# Patient Record
(demographics unavailable — no encounter records)

---

## 2025-05-20 NOTE — HISTORY OF PRESENT ILLNESS
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  5/20/25: f/u MRI R wrist. f/u RIGHT wrist sprain/contusion. in wrist brace most of the day, removes it for driving and sleeping.  reports wrist pain is a little better. c/o persistent headache. + Ibuprofen, Tylenol PRN pain.  MRI of R wrist 4/25/25 - IMPRESSION: 1. Mild degeneration of the interosseous scapholunate ligament without tear or widening of the scapholunate space. 2. Septated and lobulated ganglion cyst measuring 1.4 x 2.2 x 0.3 cm, arising from the volar extrinsic ligaments. 3. Near complete medial dislocation of the extensor carpi ulnaris tendon from its ulnar groove without associated tendinopathy or tear. 4. Enlarged and T2 hyperintense median nerve within the carpal tunnel, suggestive of median neuritis. 5. Intact TFCC. 6. No acute osseous abnormality.  4/22/25: 43yo female (RHD) presents for RIGHT wrist after an injury at work on 4/8/25. A light fixture fell from the ceiling and struck her dorsal ulnar wrist when she put her hands up to protect her head. Reports that she also injured her head. Went to [facility] ER on DOI => XR of R forearm, wrist, hand. Returned to [facility] due to headache => CT head. c/o diffuse dorsal wrist pain that radiates to radial and ulnar aspects of dorsal hand. c/o pain with lifting and writing. + Tylenol PRN.  Denies prior issue / injury to RIGHT hand/wrist. She is currently attending PT for work-related LEFT shoulder injury. Patient was OOW after the injury, returned to work on 4/15/25, left work that day due to headache and RIGHT wrist pain, has been OOW since then. She started vacation on 4/21/25.  Hx: none. [FreeTextEntry5] : SHERWIN is here today for RIGHT wrist MRI review.  she notes no changes since last visit.

## 2025-05-20 NOTE — IMAGING
[de-identified] : RIGHT HAND skin intact. minimal swelling of dorsal wrist. TTP to dorsal distal radius, dorsal DRUJ, dorsal ulnocarpal joint.  wrist ROM: good extension, flexion. good pronation, supination. + mild dorsal wrist pain at end flexion. good EPL, FPL. fingers good extension, flex to full fist. good finger abduction and adduction. SILT to median, ulnar, radial distribution. palpable radial pulse, brisk cap refill all digits. no triggering.   DRUJ shear => no pain, no instability. negative ECU synergy test.  @[facility] 4/8/25 XRAYS OF RIGHT FOREARM (2 views - PA AND LATERAL VIEWS): no acute displaced fracture or dislocation. @[facility] 4/8/25 XRAYS OF RIGHT WRIST (3 views - PA, OBLIQUE, AND LATERAL VIEWS): no acute displaced fracture or dislocation. incidental finding of cyst in capitate. @[facility] 4/8/25 XRAYS OF RIGHT HAND (3 views - PA, OBLIQUE, AND LATERAL VIEWS): no acute displaced fracture or dislocation. incidental finding of cyst in capitate.  @4/22/25 XRAYS OF RIGHT WRIST (4 views - bilateral , PA, OBLIQUE, AND LATERAL VIEWS): no acute displaced fracture or dislocation. incidental finding of cyst in capitate.

## 2025-05-20 NOTE — ASSESSMENT
[FreeTextEntry1] : MRI of R wrist reviewed with patient. ECU is asymptomatic.  The condition was explained to the patient. - wear wrist brace as needed for painful activity and sleeping. - prescribed OT for R wrist. - advance activity as pain allows. - Work: continue OOW (temporarily totally disabled), will return to work on 5/26/25 with restrictions - no lifting >15lbs.  F/u 6 weeks. Recommend f/u with Neurologist for headache.   FORMAL REQUEST FOR AUTHORIZATION FOR OCCUPATIONAL THERAPY FOR RIGHT WRIST.

## 2025-07-01 NOTE — HISTORY OF PRESENT ILLNESS
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  7/1/25: f/u RIGHT wrist sprain/contusion. OT 2x/wk @[facility] [place], started 6/23/25. c/o burning pain around wrist that radiates down to fingers with increased use of hand, eg since returning to work. Feels like she needs to "crack" the wrist after overuse. sometimes has pain around proximal forearm with increased use. Has difficulty with activities like opening a bottle. Reports dorsal wrist swelling last week, which has since resolved. Also noticed small finger shakes and rests in abducted position for the last 1-2 weeks. Denies new injury. Denies numbness/tingling. Reports headaches mostly resolved. Did not see Neurologist. Has h/o LEFT shoulder injury, pain sometimes radiates up to head. + wrist brace PRN pain. + Tylenol, Motrin, ice PRN. Reports that it sometimes upsets her stomach. She has returned to work - light duty. 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   5/20/25: f/u MRI R wrist. f/u RIGHT wrist sprain/contusion. in wrist brace most of the day, removes it for driving and sleeping.  reports wrist pain is a little better. c/o persistent headache. + Ibuprofen, Tylenol PRN pain.  MRI of R wrist 4/25/25 - IMPRESSION: 1. Mild degeneration of the interosseous scapholunate ligament without tear or widening of the scapholunate space. 2. Septated and lobulated ganglion cyst measuring 1.4 x 2.2 x 0.3 cm, arising from the volar extrinsic ligaments. 3. Near complete medial dislocation of the extensor carpi ulnaris tendon from its ulnar groove without associated tendinopathy or tear. 4. Enlarged and T2 hyperintense median nerve within the carpal tunnel, suggestive of median neuritis. 5. Intact TFCC. 6. No acute osseous abnormality.  4/22/25: 45yo female (RHD) presents for RIGHT wrist after an injury at work on 4/8/25. A light fixture fell from the ceiling and struck her dorsal ulnar wrist when she put her hands up to protect her head. Reports that she also injured her head. Went to [facility] ER on DOI => XR of R forearm, wrist, hand. Returned to [facility] due to headache => CT head. c/o diffuse dorsal wrist pain that radiates to radial and ulnar aspects of dorsal hand. c/o pain with lifting and writing. + Tylenol PRN.  Denies prior issue / injury to RIGHT hand/wrist. She is currently attending PT for work-related LEFT shoulder injury. Patient was OOW after the injury, returned to work on 4/15/25, left work that day due to headache and RIGHT wrist pain, has been OOW since then. She started vacation on 4/21/25.  Hx: none. [FreeTextEntry3] : 04/08/25  [FreeTextEntry5] : SHERWIN is here today to follow up on her RIGHT wrist. since last visit, pt RTW and states she is having burning at dorsal hand and intermittent swelling in wrist. 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 attending therapy 2x/week.

## 2025-07-01 NOTE — ASSESSMENT
[FreeTextEntry1] : The condition was explained to the patient. - ordered EDX to evaluate for median and ulnar neuropathy. - wear wrist brace for sleeping, and as needed during daytime activity. - continue OT for R hand/wrist. - advance activity as pain allows. - Work: continue light duty - no lifting, pushing, pulling >15lbs.  F/u after EDX.  FORMAL REQUEST FOR AUTHORIZATION FOR ELECTRODIAGNOSTIC TESTING OF RIGHT UPPER EXTREMITY.

## 2025-07-01 NOTE — IMAGING
[de-identified] : RIGHT HAND skin intact. minimal swelling of dorsal wrist. TTP diffusely to dorsal hand/wrist. wrist ROM: good extension, flexion. good pronation, supination. + dorsal hand wrist pain with wrist extension. good EPL, FPL. fingers good extension, flex to full fist. good finger abduction and adduction. SILT to median, ulnar, radial distribution. palpable radial pulse, brisk cap refill all digits. 1st DI 4/5 with pain. APB 4/5 with pain. no triggering. + Tinel's at carpal tunnel. negative Tinel's at cubital tunnel.  @[facility] 4/8/25 XRAYS OF RIGHT FOREARM (2 views - PA AND LATERAL VIEWS): no acute displaced fracture or dislocation. @[facility] 4/8/25 XRAYS OF RIGHT WRIST (3 views - PA, OBLIQUE, AND LATERAL VIEWS): no acute displaced fracture or dislocation. incidental finding of cyst in capitate. @[facility] 4/8/25 XRAYS OF RIGHT HAND (3 views - PA, OBLIQUE, AND LATERAL VIEWS): no acute displaced fracture or dislocation. incidental finding of cyst in capitate.  @4/22/25 XRAYS OF RIGHT WRIST (4 views - bilateral , PA, OBLIQUE, AND LATERAL VIEWS): no acute displaced fracture or dislocation. incidental finding of cyst in capitate.